# Patient Record
Sex: FEMALE | Race: WHITE | NOT HISPANIC OR LATINO | Employment: UNEMPLOYED | URBAN - METROPOLITAN AREA
[De-identification: names, ages, dates, MRNs, and addresses within clinical notes are randomized per-mention and may not be internally consistent; named-entity substitution may affect disease eponyms.]

---

## 2022-01-01 ENCOUNTER — HOSPITAL ENCOUNTER (EMERGENCY)
Facility: HOSPITAL | Age: 82
End: 2022-04-21
Attending: EMERGENCY MEDICINE | Admitting: EMERGENCY MEDICINE

## 2022-01-01 ENCOUNTER — LAB REQUISITION (OUTPATIENT)
Dept: LAB | Facility: HOSPITAL | Age: 82
End: 2022-01-01

## 2022-01-01 ENCOUNTER — APPOINTMENT (OUTPATIENT)
Dept: GENERAL RADIOLOGY | Facility: HOSPITAL | Age: 82
End: 2022-01-01

## 2022-01-01 ENCOUNTER — APPOINTMENT (OUTPATIENT)
Dept: CT IMAGING | Facility: HOSPITAL | Age: 82
End: 2022-01-01

## 2022-01-01 VITALS
DIASTOLIC BLOOD PRESSURE: 26 MMHG | TEMPERATURE: 91.8 F | RESPIRATION RATE: 16 BRPM | SYSTOLIC BLOOD PRESSURE: 37 MMHG | HEART RATE: 60 BPM | BODY MASS INDEX: 20.24 KG/M2 | OXYGEN SATURATION: 93 % | WEIGHT: 121.47 LBS | HEIGHT: 65 IN

## 2022-01-01 DIAGNOSIS — A41.9 SEPSIS WITH ACUTE RENAL FAILURE AND SEPTIC SHOCK, DUE TO UNSPECIFIED ORGANISM, UNSPECIFIED ACUTE RENAL FAILURE TYPE: ICD-10-CM

## 2022-01-01 DIAGNOSIS — D64.9 ACUTE ANEMIA: ICD-10-CM

## 2022-01-01 DIAGNOSIS — E86.0 ACUTE DEHYDRATION: ICD-10-CM

## 2022-01-01 DIAGNOSIS — E87.20 METABOLIC ACIDOSIS: ICD-10-CM

## 2022-01-01 DIAGNOSIS — N17.9 ACUTE KIDNEY INJURY: ICD-10-CM

## 2022-01-01 DIAGNOSIS — R65.21 SEPSIS WITH ACUTE RENAL FAILURE AND SEPTIC SHOCK, DUE TO UNSPECIFIED ORGANISM, UNSPECIFIED ACUTE RENAL FAILURE TYPE: ICD-10-CM

## 2022-01-01 DIAGNOSIS — D64.9 ANEMIA, UNSPECIFIED: ICD-10-CM

## 2022-01-01 DIAGNOSIS — N17.9 SEPSIS WITH ACUTE RENAL FAILURE AND SEPTIC SHOCK, DUE TO UNSPECIFIED ORGANISM, UNSPECIFIED ACUTE RENAL FAILURE TYPE: ICD-10-CM

## 2022-01-01 DIAGNOSIS — Z86.39 HISTORY OF HIGH CHOLESTEROL: ICD-10-CM

## 2022-01-01 DIAGNOSIS — Z87.09 HISTORY OF COPD: ICD-10-CM

## 2022-01-01 DIAGNOSIS — T68.XXXA HYPOTHERMIA, INITIAL ENCOUNTER: Primary | ICD-10-CM

## 2022-01-01 DIAGNOSIS — I73.9 PERIPHERAL ARTERIAL DISEASE: ICD-10-CM

## 2022-01-01 DIAGNOSIS — Z79.899 OTHER LONG TERM (CURRENT) DRUG THERAPY: ICD-10-CM

## 2022-01-01 DIAGNOSIS — I48.91 UNSPECIFIED ATRIAL FIBRILLATION: ICD-10-CM

## 2022-01-01 DIAGNOSIS — K92.2 GASTROINTESTINAL HEMORRHAGE, UNSPECIFIED GASTROINTESTINAL HEMORRHAGE TYPE: ICD-10-CM

## 2022-01-01 DIAGNOSIS — Z86.79 HISTORY OF HYPERTENSION: ICD-10-CM

## 2022-01-01 DIAGNOSIS — J44.9 CHRONIC OBSTRUCTIVE PULMONARY DISEASE, UNSPECIFIED: ICD-10-CM

## 2022-01-01 LAB
ABO GROUP BLD: NORMAL
ABO GROUP BLD: NORMAL
ALBUMIN SERPL-MCNC: 3.3 G/DL (ref 3.5–5.2)
ALBUMIN/GLOB SERPL: 1.1 G/DL
ALP SERPL-CCNC: 172 U/L (ref 39–117)
ALT SERPL W P-5'-P-CCNC: 27 U/L (ref 1–33)
ANION GAP SERPL CALCULATED.3IONS-SCNC: 16.5 MMOL/L (ref 5–15)
APTT PPP: 33.9 SECONDS (ref 22.2–34.2)
ARTERIAL PATENCY WRIST A: ABNORMAL
AST SERPL-CCNC: 27 U/L (ref 1–32)
BACTERIA UR QL AUTO: ABNORMAL /HPF
BASE EXCESS BLDA CALC-SCNC: -14.7 MMOL/L (ref -2–2)
BASOPHILS # BLD AUTO: 0.04 10*3/MM3 (ref 0–0.2)
BASOPHILS NFR BLD AUTO: 0.3 % (ref 0–1.5)
BDY SITE: ABNORMAL
BILIRUB SERPL-MCNC: 0.3 MG/DL (ref 0–1.2)
BILIRUB UR QL STRIP: NEGATIVE
BLD GP AB SCN SERPL QL: NEGATIVE
BUN SERPL-MCNC: 144 MG/DL (ref 8–23)
BUN/CREAT SERPL: 53.3 (ref 7–25)
CA-I BLDA-SCNC: 1.15 MMOL/L (ref 1.13–1.32)
CA-I BLDA-SCNC: 1.16 MMOL/L (ref 1.13–1.32)
CALCIUM SPEC-SCNC: 8.9 MG/DL (ref 8.6–10.5)
CHLORIDE BLDA-SCNC: 105 MMOL/L (ref 98–106)
CHLORIDE SERPL-SCNC: 100 MMOL/L (ref 98–107)
CLARITY UR: ABNORMAL
CO2 SERPL-SCNC: 15.5 MMOL/L (ref 22–29)
COHGB MFR BLD: 1.6 % (ref 0–1.5)
COLOR UR: YELLOW
CREAT SERPL-MCNC: 2.7 MG/DL (ref 0.57–1)
CRP SERPL-MCNC: 1.68 MG/DL (ref 0–0.5)
D-LACTATE SERPL-SCNC: 1.8 MMOL/L (ref 0.5–2)
DEPRECATED RDW RBC AUTO: 59.6 FL (ref 37–54)
EGFRCR SERPLBLD CKD-EPI 2021: 17.3 ML/MIN/1.73
EOSINOPHIL # BLD AUTO: 0.03 10*3/MM3 (ref 0–0.4)
EOSINOPHIL NFR BLD AUTO: 0.2 % (ref 0.3–6.2)
ERYTHROCYTE [DISTWIDTH] IN BLOOD BY AUTOMATED COUNT: 21.2 % (ref 12.3–15.4)
FHHB: 6.3 % (ref 0–5)
GAS FLOW AIRWAY: ABNORMAL L/MIN
GLOBULIN UR ELPH-MCNC: 3 GM/DL
GLUCOSE BLDA-MCNC: 118 MMOL/L (ref 65–99)
GLUCOSE BLDC GLUCOMTR-MCNC: 137 MG/DL (ref 70–99)
GLUCOSE SERPL-MCNC: 162 MG/DL (ref 65–99)
GLUCOSE UR STRIP-MCNC: NEGATIVE MG/DL
HCO3 BLDA-SCNC: 10.8 MMOL/L (ref 22–26)
HCT VFR BLD AUTO: 22.2 % (ref 34–46.6)
HEMOCCULT STL QL IA: POSITIVE
HGB BLD-MCNC: 6.7 G/DL (ref 12–15.9)
HGB BLDA-MCNC: 7 G/DL (ref 11.7–14.6)
HGB UR QL STRIP.AUTO: NEGATIVE
HOLD SPECIMEN: NORMAL
HOLD SPECIMEN: NORMAL
HYALINE CASTS UR QL AUTO: ABNORMAL /LPF
IMM GRANULOCYTES # BLD AUTO: 0.06 10*3/MM3 (ref 0–0.05)
IMM GRANULOCYTES NFR BLD AUTO: 0.5 % (ref 0–0.5)
INHALED O2 CONCENTRATION: 21 %
INR PPP: 1.36 (ref 2–3)
KETONES UR QL STRIP: ABNORMAL
LACTATE BLDA-SCNC: 4.28 MMOL/L (ref 0.5–2)
LEUKOCYTE ESTERASE UR QL STRIP.AUTO: ABNORMAL
LYMPHOCYTES # BLD AUTO: 0.42 10*3/MM3 (ref 0.7–3.1)
LYMPHOCYTES NFR BLD AUTO: 3.3 % (ref 19.6–45.3)
MAGNESIUM SERPL-MCNC: 1.8 MG/DL (ref 1.6–2.4)
MCH RBC QN AUTO: 23.3 PG (ref 26.6–33)
MCHC RBC AUTO-ENTMCNC: 30.2 G/DL (ref 31.5–35.7)
MCV RBC AUTO: 77.1 FL (ref 79–97)
METHGB BLD QL: 0.3 % (ref 0–1.5)
MODALITY: ABNORMAL
MONOCYTES # BLD AUTO: 0.14 10*3/MM3 (ref 0.1–0.9)
MONOCYTES NFR BLD AUTO: 1.1 % (ref 5–12)
NEUTROPHILS NFR BLD AUTO: 11.98 10*3/MM3 (ref 1.7–7)
NEUTROPHILS NFR BLD AUTO: 94.6 % (ref 42.7–76)
NITRITE UR QL STRIP: NEGATIVE
NOTE: ABNORMAL
NRBC BLD AUTO-RTO: 0.2 /100 WBC (ref 0–0.2)
NT-PROBNP SERPL-MCNC: ABNORMAL PG/ML (ref 0–1800)
OXYHGB MFR BLDV: 91.8 % (ref 94–99)
PCO2 BLDA: 24.1 MM HG (ref 35–45)
PH BLDA: 7.27 PH UNITS (ref 7.35–7.45)
PH UR STRIP.AUTO: <=5 [PH] (ref 5–8)
PHOSPHATE SERPL-MCNC: 5.8 MG/DL (ref 2.5–4.5)
PLATELET # BLD AUTO: 389 10*3/MM3 (ref 140–450)
PMV BLD AUTO: 9.2 FL (ref 6–12)
PO2 BLD: 379 MM[HG] (ref 0–500)
PO2 BLDA: 79.5 MM HG (ref 80–100)
POTASSIUM BLDA-SCNC: 4.87 MMOL/L (ref 3.5–5)
POTASSIUM SERPL-SCNC: 4.6 MMOL/L (ref 3.5–5.2)
PROT SERPL-MCNC: 6.3 G/DL (ref 6–8.5)
PROT UR QL STRIP: ABNORMAL
PROTHROMBIN TIME: 13.7 SECONDS (ref 9.4–12)
RBC # BLD AUTO: 2.88 10*6/MM3 (ref 3.77–5.28)
RBC # UR STRIP: ABNORMAL /HPF
REF LAB TEST METHOD: ABNORMAL
RENAL EPI CELLS #/AREA URNS HPF: ABNORMAL /HPF
RH BLD: POSITIVE
RH BLD: POSITIVE
SAO2 % BLDCOA: 93.6 % (ref 95–99)
SODIUM BLDA-SCNC: 129.7 MMOL/L (ref 136–146)
SODIUM SERPL-SCNC: 132 MMOL/L (ref 136–145)
SP GR UR STRIP: 1.02 (ref 1–1.03)
SQUAMOUS #/AREA URNS HPF: ABNORMAL /HPF
T&S EXPIRATION DATE: NORMAL
UROBILINOGEN UR QL STRIP: ABNORMAL
WBC # UR STRIP: ABNORMAL /HPF
WBC NRBC COR # BLD: 12.67 10*3/MM3 (ref 3.4–10.8)
WHOLE BLOOD HOLD SPECIMEN: NORMAL
WHOLE BLOOD HOLD SPECIMEN: NORMAL

## 2022-01-01 PROCEDURE — 83735 ASSAY OF MAGNESIUM: CPT | Performed by: EMERGENCY MEDICINE

## 2022-01-01 PROCEDURE — 84100 ASSAY OF PHOSPHORUS: CPT | Performed by: EMERGENCY MEDICINE

## 2022-01-01 PROCEDURE — 71045 X-RAY EXAM CHEST 1 VIEW: CPT

## 2022-01-01 PROCEDURE — 70450 CT HEAD/BRAIN W/O DYE: CPT

## 2022-01-01 PROCEDURE — 82330 ASSAY OF CALCIUM: CPT | Performed by: EMERGENCY MEDICINE

## 2022-01-01 PROCEDURE — 74176 CT ABD & PELVIS W/O CONTRAST: CPT

## 2022-01-01 PROCEDURE — 85025 COMPLETE CBC W/AUTO DIFF WBC: CPT | Performed by: EMERGENCY MEDICINE

## 2022-01-01 PROCEDURE — 96367 TX/PROPH/DG ADDL SEQ IV INF: CPT

## 2022-01-01 PROCEDURE — 83605 ASSAY OF LACTIC ACID: CPT | Performed by: EMERGENCY MEDICINE

## 2022-01-01 PROCEDURE — 25010000002 VANCOMYCIN 5 G RECONSTITUTED SOLUTION: Performed by: EMERGENCY MEDICINE

## 2022-01-01 PROCEDURE — 86900 BLOOD TYPING SEROLOGIC ABO: CPT | Performed by: EMERGENCY MEDICINE

## 2022-01-01 PROCEDURE — 25010000002 CEFEPIME PER 500 MG: Performed by: EMERGENCY MEDICINE

## 2022-01-01 PROCEDURE — 82962 GLUCOSE BLOOD TEST: CPT

## 2022-01-01 PROCEDURE — 96375 TX/PRO/DX INJ NEW DRUG ADDON: CPT

## 2022-01-01 PROCEDURE — 36600 WITHDRAWAL OF ARTERIAL BLOOD: CPT | Performed by: EMERGENCY MEDICINE

## 2022-01-01 PROCEDURE — 93010 ELECTROCARDIOGRAM REPORT: CPT | Performed by: INTERNAL MEDICINE

## 2022-01-01 PROCEDURE — 25010000002 ONDANSETRON PER 1 MG: Performed by: EMERGENCY MEDICINE

## 2022-01-01 PROCEDURE — P9612 CATHETERIZE FOR URINE SPEC: HCPCS

## 2022-01-01 PROCEDURE — 86900 BLOOD TYPING SEROLOGIC ABO: CPT

## 2022-01-01 PROCEDURE — 83050 HGB METHEMOGLOBIN QUAN: CPT | Performed by: EMERGENCY MEDICINE

## 2022-01-01 PROCEDURE — 82375 ASSAY CARBOXYHB QUANT: CPT | Performed by: EMERGENCY MEDICINE

## 2022-01-01 PROCEDURE — 93005 ELECTROCARDIOGRAM TRACING: CPT | Performed by: EMERGENCY MEDICINE

## 2022-01-01 PROCEDURE — 85730 THROMBOPLASTIN TIME PARTIAL: CPT | Performed by: EMERGENCY MEDICINE

## 2022-01-01 PROCEDURE — 99285 EMERGENCY DEPT VISIT HI MDM: CPT

## 2022-01-01 PROCEDURE — 82805 BLOOD GASES W/O2 SATURATION: CPT | Performed by: EMERGENCY MEDICINE

## 2022-01-01 PROCEDURE — 87040 BLOOD CULTURE FOR BACTERIA: CPT

## 2022-01-01 PROCEDURE — 86901 BLOOD TYPING SEROLOGIC RH(D): CPT

## 2022-01-01 PROCEDURE — 36415 COLL VENOUS BLD VENIPUNCTURE: CPT

## 2022-01-01 PROCEDURE — 80053 COMPREHEN METABOLIC PANEL: CPT | Performed by: EMERGENCY MEDICINE

## 2022-01-01 PROCEDURE — P9016 RBC LEUKOCYTES REDUCED: HCPCS

## 2022-01-01 PROCEDURE — 81001 URINALYSIS AUTO W/SCOPE: CPT | Performed by: EMERGENCY MEDICINE

## 2022-01-01 PROCEDURE — 86901 BLOOD TYPING SEROLOGIC RH(D): CPT | Performed by: EMERGENCY MEDICINE

## 2022-01-01 PROCEDURE — 86923 COMPATIBILITY TEST ELECTRIC: CPT

## 2022-01-01 PROCEDURE — 86140 C-REACTIVE PROTEIN: CPT | Performed by: EMERGENCY MEDICINE

## 2022-01-01 PROCEDURE — 36430 TRANSFUSION BLD/BLD COMPNT: CPT

## 2022-01-01 PROCEDURE — 85610 PROTHROMBIN TIME: CPT | Performed by: EMERGENCY MEDICINE

## 2022-01-01 PROCEDURE — 96365 THER/PROPH/DIAG IV INF INIT: CPT

## 2022-01-01 PROCEDURE — 83880 ASSAY OF NATRIURETIC PEPTIDE: CPT | Performed by: INTERNAL MEDICINE

## 2022-01-01 PROCEDURE — 82274 ASSAY TEST FOR BLOOD FECAL: CPT | Performed by: EMERGENCY MEDICINE

## 2022-01-01 PROCEDURE — 86850 RBC ANTIBODY SCREEN: CPT | Performed by: EMERGENCY MEDICINE

## 2022-01-01 RX ORDER — OMEPRAZOLE 20 MG/1
20 CAPSULE, DELAYED RELEASE ORAL DAILY
COMMUNITY

## 2022-01-01 RX ORDER — METOPROLOL TARTRATE 100 MG/1
100 TABLET ORAL 2 TIMES DAILY
COMMUNITY

## 2022-01-01 RX ORDER — BISACODYL 5 MG/1
5 TABLET, DELAYED RELEASE ORAL DAILY PRN
COMMUNITY

## 2022-01-01 RX ORDER — ATORVASTATIN CALCIUM 80 MG/1
80 TABLET, FILM COATED ORAL DAILY
COMMUNITY

## 2022-01-01 RX ORDER — LORATADINE 10 MG/1
10 TABLET ORAL DAILY
COMMUNITY

## 2022-01-01 RX ORDER — ONDANSETRON 2 MG/ML
4 INJECTION INTRAMUSCULAR; INTRAVENOUS ONCE
Status: COMPLETED | OUTPATIENT
Start: 2022-01-01 | End: 2022-01-01

## 2022-01-01 RX ORDER — MULTIPLE VITAMINS W/ MINERALS TAB 9MG-400MCG
1 TAB ORAL DAILY
COMMUNITY

## 2022-01-01 RX ORDER — CLOPIDOGREL BISULFATE 75 MG/1
75 TABLET ORAL DAILY
COMMUNITY

## 2022-01-01 RX ORDER — OMEGA-3S/DHA/EPA/FISH OIL/D3 300MG-1000
400 CAPSULE ORAL DAILY
COMMUNITY

## 2022-01-01 RX ORDER — FUROSEMIDE 40 MG/1
40 TABLET ORAL 2 TIMES DAILY
COMMUNITY

## 2022-01-01 RX ORDER — HYDROXYZINE 50 MG/1
50 TABLET, FILM COATED ORAL 3 TIMES DAILY PRN
COMMUNITY

## 2022-01-01 RX ORDER — TRAZODONE HYDROCHLORIDE 50 MG/1
25 TABLET ORAL NIGHTLY
COMMUNITY

## 2022-01-01 RX ORDER — ROPINIROLE 2 MG/1
2 TABLET, FILM COATED ORAL NIGHTLY
COMMUNITY

## 2022-01-01 RX ORDER — POTASSIUM CHLORIDE 20 MEQ/1
20 TABLET, EXTENDED RELEASE ORAL 2 TIMES DAILY
COMMUNITY

## 2022-01-01 RX ORDER — ASPIRIN 81 MG/1
81 TABLET ORAL DAILY
COMMUNITY

## 2022-01-01 RX ORDER — SODIUM CHLORIDE 0.9 % (FLUSH) 0.9 %
10 SYRINGE (ML) INJECTION AS NEEDED
Status: DISCONTINUED | OUTPATIENT
Start: 2022-01-01 | End: 2022-01-01 | Stop reason: HOSPADM

## 2022-01-01 RX ORDER — CEFEPIME 1 G/50ML
2 INJECTION, SOLUTION INTRAVENOUS ONCE
Status: COMPLETED | OUTPATIENT
Start: 2022-01-01 | End: 2022-01-01

## 2022-01-01 RX ORDER — SODIUM CHLORIDE, SODIUM LACTATE, POTASSIUM CHLORIDE, CALCIUM CHLORIDE 600; 310; 30; 20 MG/100ML; MG/100ML; MG/100ML; MG/100ML
125 INJECTION, SOLUTION INTRAVENOUS CONTINUOUS
Status: DISCONTINUED | OUTPATIENT
Start: 2022-01-01 | End: 2022-01-01 | Stop reason: HOSPADM

## 2022-01-01 RX ORDER — NOREPINEPHRINE BIT/0.9 % NACL 8 MG/250ML
.02-.3 INFUSION BOTTLE (ML) INTRAVENOUS
Status: DISCONTINUED | OUTPATIENT
Start: 2022-01-01 | End: 2022-01-01 | Stop reason: HOSPADM

## 2022-01-01 RX ADMIN — ONDANSETRON 4 MG: 2 INJECTION INTRAMUSCULAR; INTRAVENOUS at 23:32

## 2022-01-01 RX ADMIN — Medication 0.02 MCG/KG/MIN: at 00:54

## 2022-01-01 RX ADMIN — SODIUM CHLORIDE 1000 ML: 9 INJECTION, SOLUTION INTRAVENOUS at 21:22

## 2022-01-01 RX ADMIN — CEFEPIME 2 G: 1 INJECTION, SOLUTION INTRAVENOUS at 21:26

## 2022-01-01 RX ADMIN — SODIUM CHLORIDE, POTASSIUM CHLORIDE, SODIUM LACTATE AND CALCIUM CHLORIDE 500 ML: 600; 310; 30; 20 INJECTION, SOLUTION INTRAVENOUS at 00:15

## 2022-01-01 RX ADMIN — VANCOMYCIN HYDROCHLORIDE 1000 MG: 5 INJECTION, POWDER, LYOPHILIZED, FOR SOLUTION INTRAVENOUS at 22:08

## 2022-04-21 NOTE — ED PROVIDER NOTES
Time: 11:19 PM EDT  Arrived by: ambulance  Chief Complaint: Weakness and low blood pressure  History provided by: Patient and sons  History is limited by: The patient is a vague historian      History of Present Illness:  Patient is a 82 y.o. year old female that presents to the emergency department with weakness and low blood pressure.  According to the sounds the patient began having diarrhea on Monday.  The patient had a mild amount of diarrhea.  They deny any history of melena or hematochezia.  Patient denies any abdominal pain.  Since that time the patient's had poor intake and increasing weakness.  The patient and son do note a fall 2 weeks ago where she fell and hit her face.  The patient did have a large amount of bruising and soft tissue swelling but was not seen.  Patient notes that she feels very weak and fatigued.  The patient is currently at assisted living and was evaluated by home nursing today.  They noticed that his blood pressure was low and sent her to the emergency room.  The patient also notes chronic wounds to her legs.  Patient has been receiving wound care at the assisted [facility].  The patient had a recent hospitalization that resulted in her going to rehab for about a week and then she was transferred back to assisted living..  The patient is a very vague historian and other history is difficult to obtain      Similar Symptoms Previously: Unknown  Recently seen: Yes      Patient Care Team  Primary Care Provider: Provider, Abby Known    Past Medical History:     Allergies   Allergen Reactions   • Penicillins Hives     History reviewed. No pertinent past medical history.  History reviewed. No pertinent surgical history.  History reviewed. No pertinent family history.    Home Medications:  Prior to Admission medications    Medication Sig Start Date End Date Taking? Authorizing Provider   apixaban (ELIQUIS) 5 MG tablet tablet Take 5 mg by mouth 2 (Two) Times a Day.    Provider, MD Kiki    aspirin 81 MG EC tablet Take 81 mg by mouth Daily.    Kiki Roland MD   atorvastatin (LIPITOR) 80 MG tablet Take 80 mg by mouth Daily.    Kiki Roland MD   bisacodyl (DULCOLAX) 5 MG EC tablet Take 5 mg by mouth Daily As Needed for Constipation.    Kiki Roland MD   cholecalciferol (VITAMIN D3) 10 MCG (400 UNIT) tablet Take 400 Units by mouth Daily.    Kiki Roland MD   clopidogrel (PLAVIX) 75 MG tablet Take 75 mg by mouth Daily.    Kiki Roland MD   dilTIAZem (CARDIZEM) 30 MG tablet Take 30 mg by mouth 4 (Four) Times a Day.    Kiki Roland MD   Fluticasone-Umeclidin-Vilant (Trelegy Ellipta) 100-62.5-25 MCG/INH inhaler Inhale 1 puff Daily.    Kiki Roland MD   furosemide (LASIX) 40 MG tablet Take 40 mg by mouth 2 (Two) Times a Day.    Kiki Roland MD   hydrOXYzine (ATARAX) 50 MG tablet Take 50 mg by mouth 3 (Three) Times a Day As Needed for Itching.    Kiki Roland MD   ipratropium-albuterol (COMBIVENT RESPIMAT)  MCG/ACT inhaler Inhale 1 puff 4 (Four) Times a Day As Needed for Wheezing.    Kiki Roland MD   loratadine (CLARITIN) 10 MG tablet Take 10 mg by mouth Daily.    Kiki Roland MD   metoprolol tartrate (LOPRESSOR) 100 MG tablet Take 100 mg by mouth 2 (Two) Times a Day.    Kiki Roland MD   multivitamin with minerals (RA VISION-TATE PRESERVE PO) Take 1 tablet by mouth Daily.    Kiki Roland MD   omeprazole (priLOSEC) 20 MG capsule Take 20 mg by mouth Daily.    Kiki Roland MD   potassium chloride (K-DUR,KLOR-CON) 20 MEQ CR tablet Take 20 mEq by mouth 2 (Two) Times a Day.    Kiki Roland MD   rOPINIRole (REQUIP) 2 MG tablet Take 2 mg by mouth Every Night.    Kiki Roland MD   traZODone (DESYREL) 50 MG tablet Take 25 mg by mouth Every Night.    Kiki Roland MD        Social History:           Record Review:  I have reviewed the patient's records in [facility].  "    Review of Systems:  Review of Systems   Unable to perform ROS: Other (Patient is a poor historian and history is very limited)   Constitutional: Positive for chills and fatigue. Negative for diaphoresis and fever.   HENT: Negative for congestion, postnasal drip, rhinorrhea and sore throat.    Eyes: Negative for photophobia.   Respiratory: Negative for cough, chest tightness and shortness of breath.    Cardiovascular: Negative for chest pain, palpitations and leg swelling.   Gastrointestinal: Positive for nausea. Negative for abdominal pain, diarrhea and vomiting.   Genitourinary: Negative for difficulty urinating, dysuria, flank pain, frequency, hematuria and urgency.   Musculoskeletal: Positive for myalgias. Negative for back pain, neck pain and neck stiffness.   Skin: Positive for rash and wound. Negative for pallor.   Neurological: Positive for weakness. Negative for dizziness, syncope, numbness and headaches.   Hematological: Negative for adenopathy. Does not bruise/bleed easily.   Psychiatric/Behavioral: Negative.            Physical Exam:  BP (!) 37/26   Pulse 60   Temp (!) 91.8 °F (33.2 °C)   Resp 16   Ht 165.1 cm (65\")   Wt 55.1 kg (121 lb 7.6 oz)   SpO2 93%   BMI 20.21 kg/m²     Physical Exam  Vitals and nursing note reviewed.   Constitutional:       General: She is not in acute distress.     Appearance: She is ill-appearing and toxic-appearing. She is not diaphoretic.   HENT:      Head: Normocephalic.      Jaw: There is normal jaw occlusion.        Comments: Patient has a large hematoma located to the right frontal scalp.  The patient has a large amount of periorbital ecchymosis bilaterally as well as infraorbital ecchymosis.  It all appears old.  There is no bony step-off on palpation of the orbital rims bilaterally     Nose: Nose normal.      Mouth/Throat:      Mouth: Mucous membranes are dry.   Eyes:      Pupils: Pupils are equal, round, and reactive to light.   Neck:      Vascular: No " carotid bruit.   Cardiovascular:      Rate and Rhythm: Normal rate and regular rhythm.      Pulses:           Carotid pulses are 2+ on the right side and 2+ on the left side.       Radial pulses are 1+ on the right side and 1+ on the left side.        Femoral pulses are 1+ on the right side and 1+ on the left side.       Dorsalis pedis pulses are 0 on the right side and 0 on the left side.        Posterior tibial pulses are detected w/ Doppler on the right side and detected w/ Doppler on the left side.      Heart sounds: Normal heart sounds. No murmur heard.     Comments:   The patient's feet were cold bilaterally.  The right foot was colder than the left.  Patient did not have palpable pulses for both the dorsal pedal and posterior tibial bilaterally.  The patient however had good Doppler pulse of the posterior tibial bilaterally  Pulmonary:      Effort: Pulmonary effort is normal. No tachypnea, bradypnea, accessory muscle usage, respiratory distress or retractions.      Breath sounds: Examination of the right-upper field reveals decreased breath sounds. Examination of the left-upper field reveals decreased breath sounds. Examination of the right-middle field reveals decreased breath sounds. Examination of the left-middle field reveals decreased breath sounds. Examination of the right-lower field reveals decreased breath sounds. Examination of the left-lower field reveals decreased breath sounds. Decreased breath sounds present. No wheezing, rhonchi or rales.   Abdominal:      General: Abdomen is flat. There is no distension or abdominal bruit.      Palpations: Abdomen is soft. There is no fluid wave, mass or pulsatile mass.      Tenderness: There is no abdominal tenderness. There is no right CVA tenderness, left CVA tenderness, guarding or rebound.      Hernia: A hernia is present. Hernia is present in the left femoral area.      Comments: No rigidity    Patient has a large reducible femoral hernia on the left    Genitourinary:     Rectum: Guaiac result positive.   Musculoskeletal:         General: Tenderness present. No swelling or deformity.      Cervical back: Neck supple. No signs of trauma, torticollis or tenderness. No pain with movement, spinous process tenderness or muscular tenderness.      Right lower leg: No edema.      Left lower leg: No edema.   Skin:     General: Skin is warm and dry.      Capillary Refill: Capillary refill takes more than 3 seconds.      Coloration: Skin is cyanotic, mottled and pale. Skin is not jaundiced.      Findings: Erythema present.      Comments: The patient's right leg appeared colder than left upon palpation.  The patient had superficial ulceration of the right posterior calf and the right anterior foot.  There is also notable erythema that involve the foot and distal one third of the leg.  Again, it was cool to touch    The patient also had superficial ulcerations located in the back of the left calf as well.   Neurological:      General: No focal deficit present.      Mental Status: She is alert. She is disoriented.      GCS: GCS eye subscore is 4. GCS verbal subscore is 5. GCS motor subscore is 6.      Sensory: No sensory deficit.      Motor: Weakness present.      Comments: The patient has diffuse generalized weakness    Complete neurological exam cannot be performed due to poor comprehension   Psychiatric:         Mood and Affect: Mood normal.         Behavior: Behavior normal.                Medications in the Emergency Department:  Medications   cefepime (MAXIPIME) IVPB 2 g (premix) in D5 (0 g Intravenous Stopped 4/20/22 2208)   vancomycin 1000 mg/250 mL 0.9% NS IVPB ([facility]) (0 mg/kg × 55.1 kg Intravenous Stopped 4/20/22 2324)   sodium chloride 0.9 % bolus 1,000 mL (0 mL Intravenous Stopped 4/20/22 2208)   ondansetron (ZOFRAN) injection 4 mg (4 mg Intravenous Given 4/20/22 2332)   lactated ringers bolus 500 mL (0 mL Intravenous Stopped 4/21/22 0115)        Labs  Lab Results  "(last 24 hours)     ** No results found for the last 24 hours. **           Imaging:  CT Abdomen Pelvis Without Contrast   Final Result       1. There is a bowel-containing large left inguinal hernia.  It measures about 9.6 cm in greatest    diameter.  A closed loop small bowel obstruction associated with the finding is possible.  The    bowel contained in the hernia sac is dilated (about 3 cm) with intraluminal content that is    \"fecalized,\" such as with obstructed debris.  Please correlate with physical exam findings and    clinically, as an incarcerated, strangulated loop of small bowel in the left inguinal hernia sac    cannot be excluded.     2. A moderate to large amount of formed stool is seen throughout the colon.  There may be fecal    stasis as with constipation.     3. 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  No acute retroperitoneal or intraperitoneal hematoma.  Minimal, if any, ascites is seen.     4. There is mild to moderate cardiomegaly.     5. A small to moderate right pleural effusion is seen.  Minimal if any left pleural effusion is    identified.     6. There may be fibrosis and/or atelectasis in the lung bases.  Pulmonary edema is also possible.     Probably no focal lobar infiltrate is seen.     7. A left-sided CIED is partially imaged on the study.     8. There is anasarca.     9. There are several limitations on the exam, such as due to patient motion, the patient's upper    extremities in the scan field of view, and other external artifacts in the scan field of view.     10. Probably no acute appendicitis is seen.  The appendix is not well seen.  No definite acute    inflammatory change is seen in the right lower quadrant.     11. The patient has undergone cholecystectomy and hysterectomy.     12. No hydronephrosis or obstructive uropathy.  No definite nephrolithiasis or ureterolithiasis.     13. Please see above comments " for further detail.         1.                COMMENT:  Part of this note is an electronic transcription of spoken language to printed text. The    electronic translation/transcription may permit erroneous, or at times, nonsensical (or even    sensical) words or phrases to be inadvertently transcribed or omitted; this  has    reviewed the note for such errors (as well as additional errors); however, some may still exist.       CHELE JENKINS JR, MD          Electronically Signed and Approved By: CHELE JENKINS JR, MD on 4/21/2022 at 0:37                               CT Head Without Contrast   Final Result       No acute brain abnormality is seen.  No acute intracranial hemorrhage.  No acute skull fracture.     There is an acute hemorrhagic right frontal scalp contusion.               COMMENT:  Part of this note is an electronic transcription of spoken language to printed text. The    electronic translation/transcription may permit erroneous, or at times, nonsensical (or even    sensical) words or phrases to be inadvertently transcribed or omitted; this  has    reviewed the note for such errors (as well as additional errors); however, some may still exist.       CHELE JENKINS JR, MD          Electronically Signed and Approved By: CHELE JENKINS JR, MD on 4/21/2022 at 0:23                               XR Chest 1 View   Final Result       1. There is new mild-to-moderate cardiomegaly.  A left-sided CIED is in place.  A new asymmetric    opacity involves the right pulmonary apex and is nonspecific.  Consider close interval clinical and    imaging follow-up to exclude a malignant process.     2. There has been interval total right shoulder (reverse) arthroplasty.     3. The thoracic aorta is atherosclerotic and ectatic.     4. Probably no acute infiltrate is seen otherwise.                       COMMENT:  Part of this note is an electronic transcription of spoken language to printed text. The     electronic translation/transcription may permit erroneous, or at times, nonsensical (or even    sensical) words or phrases to be inadvertently transcribed or omitted; this  has    reviewed the note for such errors (as well as additional errors); however, some may still exist.       CHELE JENKINS JR, MD          Electronically Signed and Approved By: CHELE JENKINS JR, MD on 4/20/2022 at 21:32                                   Procedures:  Procedures    Progress  ED Course as of 04/22/22 0343   Wed Apr 20, 2022   2240 EKG:    Rhythm: Paced rhythm  Rate: 60  No other analysis made due to the paced rhythm    EKG Comparison: Unavailable for comparison    Interpreted by me   [SD]      ED Course User Index  [SD] Robert Woodard DO                            Medical Decision Making:  MDM  Number of Diagnoses or Management Options  Acute anemia  Acute dehydration  Acute kidney injury (HCC)  Gastrointestinal hemorrhage, unspecified gastrointestinal hemorrhage type  History of COPD  History of high cholesterol  History of hypertension  Hypothermia, initial encounter  Metabolic acidosis  Peripheral arterial disease (HCC)  Diagnosis management comments:     Sepsis criteria was met in the emergency department and the Sepsis protocol (including antibiotic administration) was initiated.      SIRS criteria considered:   1.                     Temperature > 100.4 or <98.6    2.                     Heart Rate > 90    3.                     Respiratory Rate > 22    4.                     WBC > 12K or <4K.             Severe Sepsis:     Respiratory: Mechanical Ventilation or Bipap  Hypotension: SBP > 90 or MAP < 65  Renal: Creatinine > 2  Metabolic: Lactic Acid > 2  Hematologic: Platelets < 100K or INR > 1.5  Hepatic: BILI  >  2  CNS: Sudden AMS     Septic Shock:     Severe Sepsis + Persistent hypotension or Lactic Acid > 4     Normal saline bolus, Antibiotics, and final disposition was based on these definitions.            The patient appeared dry and hypotensive upon arrival.  The patient was given 2 L of fluid and responded very well to the fluid bolus.  After the fluids the patient's blood pressure improved from 79/58- to 91/64 after the fluid bolus..  Patient maintained her blood pressure for 2 hours her blood pressure remained normal until 2217 and her blood pressure at that time was 103/59.  I was notified at 1225 the patient's blood pressure had dropped to 55/41.  I immediately wrote for another 500 cc bolus due to the patient's BUN and creatinine started on maintenance fluids.  The patient's blood returned at that time as well.  The patient was administered 1 unit of blood.  The patient at this point did not respond to the fluid resuscitation and I discussed intubation resuscitation with the patient.    I then discussed intubation and resuscitation with the patient.  Patient states that she absolutely did not want intubation or to be resuscitated.  Due to the patient's sepsis and low blood pressure I called the sons to the bedside.  They have had this discussion with their mother in the past.  They state that she has suffered a lot recently.  She has told him in the past that she does not want to be intubated or resuscitated.  They believe that she is capable of making her own medical decision as she has said this in the past.  The patient subsequently will not be intubated or sedated upon her request.  The sons are aware, were at bedside and agree with the patient's decision.  We continue to administer the patient's blood, IV fluids and the patient was started on Levophed.  The patient quickly declined and went into respiratory arrest.  The patient's respiratory arrest proceeded to cardiac arrest.  An ultrasound of the heart was performed shortly after that point and the patient had no cardiac motion.  The patient was apneic.  The patient was pulseless.  The patient's GCS was 3.  The patient's pupils were dilated.   The patient had no blood pressure.  Subsequently the patient  at 1:10 AM.    The patient  at 1:10 AM.  Sons at bedside.  I discussed possible autopsy with the sons at bedside.  They did not want an autopsy performed.  The case was discussed with the  agreed this was a natural death the patient's body will be released to the  home.       Amount and/or Complexity of Data Reviewed  Clinical lab tests: reviewed  Tests in the radiology section of CPT®: reviewed  Tests in the medicine section of CPT®: reviewed  Decide to obtain previous medical records or to obtain history from someone other than the patient: yes  Discuss the patient with other providers: yes (22:11:  I discussed case with Dr. Hicks.  We have discussed the patient's vascular evaluation.  Due to the chronicity of the ulcerations of the leg and the fact that the patient did have Doppler posterior tibial pulses reticular on the right as well, he did not feel that the patient needed acute arterial intervention.  He request the patient be admitted to the hospital and he will see the patient in consultation)    Critical Care  Total time providing critical care: 30-74 minutes (Total critical care time of.  Total critical care time documented does not include time spent on separately billed procedures for services of nurses or physician assistants.  I personally saw and examined the patient.  I have reviewed all diagnostic interpretations and treatment plans as written.  I was present for the key portions of any procedures performed and the inclusive time noted in any critical care statement.  Critical care time includes patient management by me, time spent at the patient bedside, time to review labs/ ABG's, imaging results, discussing patient care, documentation in the medical record and time spent with family or caregiver)             Final diagnoses:   Hypothermia, initial encounter   Acute kidney injury (HCC)   Acute dehydration    Acute anemia   Peripheral arterial disease (HCC)   Gastrointestinal hemorrhage, unspecified gastrointestinal hemorrhage type   Metabolic acidosis   History of COPD   History of high cholesterol   History of hypertension   Sepsis with acute renal failure and septic shock, due to unspecified organism, unspecified acute renal failure type (HCC)        Disposition:  ED Disposition     ED Disposition       Condition   --    Comment   --             This medical record created using voice recognition software.    DO Yamilet Lisa Scott, DO  [phone]

## 2022-04-22 LAB
BH BB BLOOD EXPIRATION DATE: NORMAL
BH BB BLOOD TYPE BARCODE: 6200
BH BB DISPENSE STATUS: NORMAL
BH BB PRODUCT CODE: NORMAL
BH BB UNIT NUMBER: NORMAL
CROSSMATCH INTERPRETATION: NORMAL
UNIT  ABO: NORMAL
UNIT  RH: NORMAL

## 2022-04-25 LAB
BACTERIA SPEC AEROBE CULT: NORMAL
BACTERIA SPEC AEROBE CULT: NORMAL